# Patient Record
Sex: FEMALE | Race: BLACK OR AFRICAN AMERICAN | NOT HISPANIC OR LATINO | Employment: FULL TIME | URBAN - METROPOLITAN AREA
[De-identification: names, ages, dates, MRNs, and addresses within clinical notes are randomized per-mention and may not be internally consistent; named-entity substitution may affect disease eponyms.]

---

## 2023-03-22 ENCOUNTER — OFFICE VISIT (OUTPATIENT)
Dept: FAMILY MEDICINE CLINIC | Facility: CLINIC | Age: 68
End: 2023-03-22

## 2023-03-22 VITALS
RESPIRATION RATE: 16 BRPM | OXYGEN SATURATION: 98 % | DIASTOLIC BLOOD PRESSURE: 80 MMHG | HEART RATE: 65 BPM | HEIGHT: 63 IN | BODY MASS INDEX: 28.7 KG/M2 | WEIGHT: 162 LBS | SYSTOLIC BLOOD PRESSURE: 122 MMHG | TEMPERATURE: 97.5 F

## 2023-03-22 DIAGNOSIS — R07.89 OTHER CHEST PAIN: ICD-10-CM

## 2023-03-22 DIAGNOSIS — E11.9 TYPE 2 DIABETES MELLITUS WITHOUT COMPLICATION, UNSPECIFIED WHETHER LONG TERM INSULIN USE (HCC): Primary | ICD-10-CM

## 2023-03-22 PROBLEM — E55.9 VITAMIN D DEFICIENCY: Status: ACTIVE | Noted: 2021-07-29

## 2023-03-22 PROBLEM — R91.1 PULMONARY NODULE: Status: ACTIVE | Noted: 2023-03-17

## 2023-03-22 PROBLEM — E78.2 MIXED HYPERLIPIDEMIA: Status: ACTIVE | Noted: 2021-07-29

## 2023-03-22 RX ORDER — INSULIN GLARGINE 100 [IU]/ML
16 INJECTION, SOLUTION SUBCUTANEOUS DAILY
COMMUNITY
Start: 2023-03-17

## 2023-03-22 RX ORDER — INSULIN GLARGINE-YFGN 100 [IU]/ML
10 INJECTION, SOLUTION SUBCUTANEOUS EVERY MORNING
COMMUNITY
Start: 2023-01-05

## 2023-03-22 RX ORDER — ROSUVASTATIN CALCIUM 40 MG/1
40 TABLET, COATED ORAL DAILY
COMMUNITY
Start: 2022-07-28 | End: 2023-07-28

## 2023-03-22 NOTE — ASSESSMENT & PLAN NOTE
Lab Results   Component Value Date    HGBA1C 11 3 (H) 03/09/2023     Poorly controlled  Is following with [facility] RN for diabetes education  Was told to hold insulin due to hypoglycemia risk since her blood sugars have been much better controlled with diet  Post prandial 1h about 130-150 without insulin per patient  Recommend starting Metformin 500 mg qAM with breakfast since she previously had nausea with higher dose and discontinued  Discussed no risk of hypoglycemia with Metformin  She is agreeable to a trial  Will ask [facility] nurse to forward notes  Follow up in 3 months

## 2023-03-22 NOTE — PROGRESS NOTES
Name: Katherine Bang      : 1955      MRN: [id number]  Encounter Provider: Niko Rivera DO  Encounter Date: 3/22/2023   Encounter department: [address] [place] [address]      1  Type 2 diabetes mellitus without complication, unspecified whether long term insulin use (HCC)  Assessment & Plan:    Lab Results   Component Value Date    HGBA1C 11 3 (H) 2023     Poorly controlled  Is following with [facility] RN for diabetes education  Was told to hold insulin due to hypoglycemia risk since her blood sugars have been much better controlled with diet  Post prandial 1h about 130-150 without insulin per patient  Recommend starting Metformin 500 mg qAM with breakfast since she previously had nausea with higher dose and discontinued  Discussed no risk of hypoglycemia with Metformin  She is agreeable to a trial  Will ask [facility] nurse to forward notes  Follow up in 3 months  Orders:  -     metFORMIN (GLUCOPHAGE) 500 mg tablet; Take 1 tablet (500 mg total) by mouth daily with breakfast    2  Other chest pain  Assessment & Plan:  Has had several ED visits with negative cardiac workup, diagnosed with chest wall pain  Recommend NSAIDs and stretching, can use topical analgesics as well  If no improvement will send to PT  Subjective      HPI     Chest pain on the left side, was seen at [facility] and heart was normal, diagnosed with chest wall pain  Has history of DM, poorly controlled  Chest wall pain is most concerning to her today  She has been taking baby aspirin prn but without relief  She will take Advil and use topical analgesics for pain relief  Saw dietician for DM yesterday, was told to stay off insulin, eat healthy for two weeks and continue monitoring  She saw David Aguirre RN, [facility] at [facility] physicians practice  One hour after eating her glucose ranges from 130-150  She felt faint after taking insulin  Last A1c 3/9/23 was 11 3   She is agreeable to starting Metformin 500 mg daily in the morning with food  Review of Systems   Constitutional: Negative  HENT: Negative  Respiratory: Negative  Cardiovascular: Positive for chest pain (reproducible with palpation)  Gastrointestinal: Negative  No current outpatient medications on file prior to visit  Objective     /80 (BP Location: Left arm, Patient Position: Sitting, Cuff Size: Large)   Pulse 65   Temp 97 5 °F (36 4 °C) (Temporal)   Resp 16   Ht 5' 3" (1 6 m)   Wt 73 5 kg (162 lb)   SpO2 98%   BMI 28 70 kg/m²     Physical Exam  Vitals reviewed  Constitutional:       Appearance: Normal appearance  HENT:      Head: Normocephalic and atraumatic  Right Ear: External ear normal       Left Ear: External ear normal       Nose: Nose normal    Eyes:      Extraocular Movements: Extraocular movements intact  Conjunctiva/sclera: Conjunctivae normal    Cardiovascular:      Rate and Rhythm: Normal rate and regular rhythm  Heart sounds: Normal heart sounds  Pulmonary:      Effort: Pulmonary effort is normal       Breath sounds: Normal breath sounds  Abdominal:      General: Abdomen is flat  Skin:     General: Skin is warm and dry  Capillary Refill: Capillary refill takes less than 2 seconds  Neurological:      General: No focal deficit present  Mental Status: She is alert and oriented to person, place, and time     Psychiatric:         Mood and Affect: Mood normal          Behavior: Behavior normal        Keyonbetsy Pantoja, DO

## 2023-03-22 NOTE — ASSESSMENT & PLAN NOTE
Has had several ED visits with negative cardiac workup, diagnosed with chest wall pain  Recommend NSAIDs and stretching, can use topical analgesics as well   If no improvement will send to PT

## 2023-03-24 ENCOUNTER — TELEPHONE (OUTPATIENT)
Dept: ADMINISTRATIVE | Facility: OTHER | Age: 68
End: 2023-03-24

## 2023-03-24 NOTE — TELEPHONE ENCOUNTER
Upon review of the In Basket request we were able to locate, review, and update the patient chart as requested for Mammogram     Any additional questions or concerns should be emailed to the Practice Liaisons via the appropriate education email address, please do not reply via In Basket      Thank you  Wayne Goldberg

## 2023-03-24 NOTE — TELEPHONE ENCOUNTER
----- Message from Stevo Toribio LPN sent at 5/45/0518  3:00 PM EDT -----  Regarding: care gap request  03/22/23 3:00 PM    Hello, our patient attached above has had Mammogram completed/performed  Please assist in updating the patient chart by pulling a previous Electronic Medical Record (EMR) document   The previous EMR is in [place] under the "Other Results" heading from [facility] date of service is 10/14/22    Thank you,  Stevo Mcmahon 1943

## 2024-06-10 ENCOUNTER — TELEPHONE (OUTPATIENT)
Age: 69
End: 2024-06-10

## 2024-06-10 NOTE — TELEPHONE ENCOUNTER
ASC Screening    ASC Screening  BMI > than 45: No  Are you currently pregnant?: No  Do you rely on a wheelchair for mobility?: No  Do you need oxygen during the day?: No  Have you ever been informed by anesthesia that you have a difficult airway?: No  Have you been diagnosed with End Stage Renal Disease (ESRD)?: No  Are you actively on dialysis?: No  Have you been diagnosed with Pulmonary Hypertension?: No  Do you have a pacemaker or an Automatic Implantable Cardioverter Defibrillator (AICD)?: No  Have you ever had an organ transplant?: No  Have you had a stroke, heart attack, myocardial infarction (MI) within the last 6 months?: No  Have you ever been diagnosed with Aortic Stenosis?: No  Have you ever been diagnosed  with Congestive Heart Failure?: No  Have you ever been diagnosed with a heart valve disease?: No  Are you Diabetic?: Yes  If you are Diabetic, has your A1C been greater than 12 within the last six months?: No

## 2024-06-10 NOTE — TELEPHONE ENCOUNTER
06/10/24  Screened by: Franci Goodson     Referring Provider      Pre- Screening:      There is no height or weight on file to calculate BMI.  Has patient been referred for a routine screening Colonoscopy? yes  Is the patient between 45-75 years old? yes        Previous Colonoscopy yes   If yes:    Date:more than 20 years ago      Facility:     Reason:         Does the patient want to see a Gastroenterologist prior to their procedure OR are they having any GI symptoms? no     Has the patient been hospitalized or had abdominal surgery in the past 6 months? no     Does the patient use supplemental oxygen? no     Does the patient take Coumadin, Lovenox, Plavix, Elliquis, Xarelto, or other blood thinning medication? no     Has the patient had a stroke, cardiac event, or stent placed in the past year? no        If patient is between 45yrs - 49yrs, please advise patient that we will have to confirm benefits & coverage with their insurance company for a routine screening colonoscopy.

## 2024-06-10 NOTE — TELEPHONE ENCOUNTER
OA Colonoscopy  Scheduled date of colonoscopy (as of today):06/17/2024  Physician performing colonoscopy:DR Jaiyeola  Location of colonoscopy:[facility]   Bowel prep reviewed with patient:miralax/dul  Instructions reviewed with patient by:sent via email  Clearances: n/a

## 2024-06-12 ENCOUNTER — ANESTHESIA EVENT (OUTPATIENT)
Dept: ANESTHESIOLOGY | Facility: HOSPITAL | Age: 69
End: 2024-06-12

## 2024-06-12 ENCOUNTER — ANESTHESIA (OUTPATIENT)
Dept: ANESTHESIOLOGY | Facility: HOSPITAL | Age: 69
End: 2024-06-12

## 2024-06-14 ENCOUNTER — TELEPHONE (OUTPATIENT)
Dept: GASTROENTEROLOGY | Facility: MEDICAL CENTER | Age: 69
End: 2024-06-14

## 2024-06-14 RX ORDER — SODIUM CHLORIDE 9 MG/ML
125 INJECTION, SOLUTION INTRAVENOUS CONTINUOUS
OUTPATIENT
Start: 2024-06-14

## 2024-06-14 NOTE — TELEPHONE ENCOUNTER
Patient's assistant/Friend who is not listed on the medical consent form called in asking questions regarding prep, procedure and arrival. Amalia was inform to have patient call in to verify details.

## 2024-06-14 NOTE — TELEPHONE ENCOUNTER
Pt calling for prep instructions, emailed over. Pt having a difficult time understanding prep instructions, was in the process of  transferring to [facility] clerical for further assistance when pt ended call.

## 2024-06-14 NOTE — TELEPHONE ENCOUNTER
Pt calling stating she needs shabnam/dul prep explained to her. I informed pt shabnam 238 grams powder and 5 mg tablets for dul. I also informed pt to  blue, yellow or frost white 64 oz gatorade. Pt said she is diabetic, I suggested the zero sugar one. Pt understood all.

## 2024-06-16 ENCOUNTER — ANESTHESIA (OUTPATIENT)
Dept: ANESTHESIOLOGY | Facility: HOSPITAL | Age: 69
End: 2024-06-16

## 2024-06-16 ENCOUNTER — ANESTHESIA EVENT (OUTPATIENT)
Dept: ANESTHESIOLOGY | Facility: HOSPITAL | Age: 69
End: 2024-06-16

## 2024-06-16 NOTE — ANESTHESIA PREPROCEDURE EVALUATION
Procedure:  PRE-OP ONLY    Relevant Problems   CARDIO   (+) Mixed hyperlipidemia   (+) Other chest pain      ENDO   (+) Type 2 diabetes mellitus without complication (HCC)      MUSCULOSKELETAL   (+) Osteoarthrosis             Anesthesia Plan  ASA Score- 3     Anesthesia Type- IV sedation with anesthesia with ASA Monitors.         Additional Monitors:     Airway Plan:            Plan Factors-    Chart reviewed.   Existing labs reviewed. Patient summary reviewed.                  Induction-     Postoperative Plan-         Informed Consent-

## 2024-06-20 ENCOUNTER — TELEPHONE (OUTPATIENT)
Dept: GASTROENTEROLOGY | Facility: MEDICAL CENTER | Age: 69
End: 2024-06-20

## 2024-06-20 NOTE — TELEPHONE ENCOUNTER
Called patient to see if she wanted to reschedule procedure patient refused, saying she is sick and procedure is too expensive. She will call within 6 months to see if she can pay for it.

## 2024-06-21 ENCOUNTER — TELEPHONE (OUTPATIENT)
Dept: GASTROENTEROLOGY | Facility: MEDICAL CENTER | Age: 69
End: 2024-06-21

## 2024-06-21 NOTE — TELEPHONE ENCOUNTER
Left voicemail and requested call back     Called patient to inform that provider would like to schedule office visit with any of our providers to other prep options.   RE: Prep  Received: Today  MD Munira Ruffin  That is the easiest preparation.  Patient may need an office visit to discuss why they cannot tolerate and also may be discussed alternative ways for screening.  This can be scheduled with anyone.          Previous Messages       ----- Message -----  From: Munira Porter  Sent: 6/21/2024   1:48 PM EDT  To: Jair Guthrie MD  Subject: Prep                                            Patient couldn't tolerate prep wishes to see if there is another alternative instead of Miralax? Please Advise

## 2024-06-21 NOTE — TELEPHONE ENCOUNTER
Patient called can not tolerate prep wishes alternative did message provider for advise.        Prep  Received: Today  Munira Guthrie MD  Patient couldn't tolerate prep wishes to see if there is another alternative instead of Miralax? Please Advise

## 2024-06-21 NOTE — TELEPHONE ENCOUNTER
Pt returned call to schedule colonoscopy. Pt requesting a different prep. Call was transferred to JENNY to assist pt.

## 2024-06-21 NOTE — TELEPHONE ENCOUNTER
Pt was contacted by the Munira Porter on 06/21/2024 3:32. Munira is awaiting her return call.  See notes from 06/21/2024. .

## 2024-06-21 NOTE — TELEPHONE ENCOUNTER
Patients GI provider:  Dr. Guthrie    Number to return call: [phone]    Reason for call: Pt and patient's assist Amalia called in to reschedule procedure. Rosaline ask if there can be an alternative medication due to patient not being able to tolerate the miralax/dul. Patient also ask if the provider was okay with patient fasting for 3 days and only having broth and no prep medication. Rosaline asked if we can speak with Amalia for future communication, advised Rosaline to update communication consent form on [employer] [facility].    Scheduled procedure/appointment date if applicable: Apt/procedure 6/28/2024

## 2024-06-24 NOTE — TELEPHONE ENCOUNTER
Pt scheduled for 6/27/24 with Dr. Waterman. Self pay estimate provided. Pt does not have MyChart. Please sent pt self pay documentation.

## 2024-06-27 ENCOUNTER — OFFICE VISIT (OUTPATIENT)
Dept: GASTROENTEROLOGY | Facility: CLINIC | Age: 69
End: 2024-06-27

## 2024-06-27 VITALS
BODY MASS INDEX: 28.7 KG/M2 | WEIGHT: 162 LBS | SYSTOLIC BLOOD PRESSURE: 112 MMHG | TEMPERATURE: 97.4 F | HEIGHT: 63 IN | DIASTOLIC BLOOD PRESSURE: 64 MMHG

## 2024-06-27 DIAGNOSIS — R10.84 GENERALIZED ABDOMINAL PAIN: ICD-10-CM

## 2024-06-27 DIAGNOSIS — K57.32 DIVERTICULITIS OF LARGE INTESTINE WITHOUT PERFORATION OR ABSCESS WITHOUT BLEEDING: ICD-10-CM

## 2024-06-27 DIAGNOSIS — Z12.11 SCREENING FOR COLON CANCER: Primary | ICD-10-CM

## 2024-06-27 DIAGNOSIS — R11.2 NAUSEA AND VOMITING, UNSPECIFIED VOMITING TYPE: ICD-10-CM

## 2024-06-27 DIAGNOSIS — R11.10 REGURGITATION OF FOOD: ICD-10-CM

## 2024-06-27 PROCEDURE — 99204 OFFICE O/P NEW MOD 45 MIN: CPT | Performed by: INTERNAL MEDICINE

## 2024-06-27 RX ORDER — BISACODYL 5 MG/1
20 TABLET, DELAYED RELEASE ORAL ONCE
Qty: 4 TABLET | Refills: 0 | Status: SHIPPED | OUTPATIENT
Start: 2024-06-27 | End: 2024-06-27

## 2024-06-27 RX ORDER — ONDANSETRON 4 MG/1
4 TABLET, ORALLY DISINTEGRATING ORAL EVERY 8 HOURS PRN
Qty: 3 TABLET | Refills: 0 | Status: SHIPPED | OUTPATIENT
Start: 2024-06-27 | End: 2024-06-28

## 2024-06-27 RX ORDER — POLYETHYLENE GLYCOL 3350 17 G/17G
238 POWDER, FOR SOLUTION ORAL ONCE
Qty: 238 G | Refills: 0 | Status: SHIPPED | OUTPATIENT
Start: 2024-06-27 | End: 2024-06-27

## 2024-06-27 NOTE — PROGRESS NOTES
[facility] - Outpatient Consultation  Rosaline Squires 69 y.o. female MRN: [id number]  Encounter: [id number]          ASSESSMENT AND PLAN:      1. Screening for colon cancer  2.  History of colon diverticulitis  3.  Left-sided abdominal pain  4.  Regurgitation of food    Patient is due for colonoscopy.  She is agreeable to get procedure done.  She would like lower volume prep.  I have prescribed Dulcolax and MiraLAX prep.  I will also prescribe her Zofran to help decrease risk for nausea and vomiting with prep intake.  She should take the medication at least the first dose prior to starting the bowel prep.  I discussed with her about getting EGD to investigate for ulcer disease given nausea and vomiting and abdominal pain symptoms.  Patient currently declined.  I discussed with her about getting CT scan to investigate for any stones in the kidneys or problems with other organs that might cause symptoms.  Patient agreeable.  Ordered the CT scan today.  Further management plan based on investigation results.    RTC 4 to 6 months.      Felipe Waterman MD  Gastroenterology  [facility]  Date: June 27, 2024    - Colonoscopy; Future  - bisacodyl (DULCOLAX) 5 mg EC tablet; Take 4 tablets (20 mg total) by mouth once for 1 dose Colonoscopy prep  Dispense: 4 tablet; Refill: 0  - polyethylene glycol (MIRALAX) 17 g packet; Take 238 g by mouth once for 1 dose For bowel prep. Mix in 64 oz of gatorade. Drink 32 oz at the rate of 8 oz/1 glass every 15 mins starting at 5 pm on the evening prior to day of procedure. Drink the remaining 32 oz 5 hours prior to procedure time at at the rate of 8 oz/1 glass every 15 mins until finished.  Dispense: 238 g; Refill: 0    ______________________________________________________________________    HPI: 69-year-old with history of diabetes presents for evaluation.    Patient denies any nausea vomiting.  She does report left-sided abdominal pain  which has been constant for a while.  Varies in intensity.  She had more abdominal pain all over the belly in the past which decreased when she reduced taking spicy foods.  She was scheduled for colonoscopy and vomited her prep up.  Could not tolerate prep.  It was large volume.  Her last colonoscopy was more than 20 years ago.  There is no family history of colon cancer.    Patient had labs done in March 2023 which showed normal blood counts, renal function liver enzymes.  In 2018 patient had CT abdomen which showed diverticulitis of the sigmoid colon.      REVIEW OF SYSTEMS:    CONSTITUTIONAL: Denies any fever, chills, rigors, and weight loss.  HEENT: No earache or tinnitus. Denies hearing loss or visual disturbances.  CARDIOVASCULAR: No chest pain or palpitations.   RESPIRATORY: Denies any cough, hemoptysis, shortness of breath or dyspnea on exertion.  GASTROINTESTINAL: As noted in the History of Present Illness.   GENITOURINARY: No problems with urination. Denies any hematuria or dysuria.  NEUROLOGIC: No dizziness or vertigo, denies headaches.   MUSCULOSKELETAL: Denies any muscle or joint pain.   SKIN: Denies skin rashes or itching.   ENDOCRINE: Denies excessive thirst. Denies intolerance to heat or cold.  PSYCHOSOCIAL: Denies depression or anxiety. Denies any recent memory loss.       Historical Information   History reviewed. No pertinent past medical history.  History reviewed. No pertinent surgical history.  Social History   Social History     Substance and Sexual Activity   Alcohol Use Never     Social History     Substance and Sexual Activity   Drug Use Never     Social History     Tobacco Use   Smoking Status Never    Passive exposure: Past   Smokeless Tobacco Never     History reviewed. No pertinent family history.    Meds/Allergies       Current Outpatient Medications:     bisacodyl (DULCOLAX) 5 mg EC tablet    metFORMIN (GLUCOPHAGE) 500 mg tablet    polyethylene glycol (MIRALAX) 17 g packet     "Empagliflozin (JARDIANCE) 10 MG TABS tablet    Insulin Glargine Solostar 100 UNIT/ML SOPN    rosuvastatin (CRESTOR) 40 MG tablet    Semglee, yfgn, 100 UNIT/ML SOPN    Allergies   Allergen Reactions    Hydrocodone-Acetaminophen GI Intolerance    Latex Other (See Comments)     Other reaction(s): itching  rash    Oxycodone Hcl GI Intolerance    Oxycodone GI Intolerance           Objective     Blood pressure 112/64, temperature (!) 97.4 °F (36.3 °C), height 5' 3\" (1.6 m), weight 73.5 kg (162 lb). Body mass index is 28.7 kg/m².        PHYSICAL EXAM:      General Appearance:   Alert, cooperative, no distress   HEENT:   Normocephalic, atraumatic, anicteric.     Neck:  Supple, symmetrical, trachea midline   Lungs:   Clear to auscultation bilaterally; no rales, rhonchi or wheezing; respirations unlabored    Heart::   Regular rate and rhythm; no murmur, rub, or gallop.   Abdomen:   Soft, non-tender, non-distended; normal bowel sounds; no masses, no organomegaly    Genitalia:   Deferred    Rectal:   Deferred    Extremities:  No cyanosis, clubbing or edema    Pulses:  2+ and symmetric    Skin:  No jaundice, rashes, or lesions    Lymph nodes:  No palpable cervical lymphadenopathy        Lab Results:   No visits with results within 1 Day(s) from this visit.   Latest known visit with results is:   Orders Only on 06/08/2022   Component Date Value    EXT Creatinine Urine 06/08/2022 116.0     Albumin,U,Random 06/08/2022 0.9     Alb/Creat Ratio 06/08/2022 7.8          Radiology Results:   No results found.   "

## 2024-06-29 NOTE — PATIENT INSTRUCTIONS
Scheduled date of colonoscopy (as of today): 07/11/2024  Physician performing colonoscopy: Dr. Waterman   Location of colonoscopy: [facility]  Bowel prep reviewed with patient: Miralax   Instructions reviewed with patient by: Francia DOWELL   Clearances:  N/A     Pt provided Diabetes Medication Instruction Sheet   CT Scan Scheduled for 07/13/2024

## 2024-07-11 ENCOUNTER — HOSPITAL ENCOUNTER (OUTPATIENT)
Dept: GASTROENTEROLOGY | Facility: MEDICAL CENTER | Age: 69
Setting detail: OUTPATIENT SURGERY
Discharge: HOME | End: 2024-07-11

## 2024-07-11 ENCOUNTER — ANESTHESIA (OUTPATIENT)
Dept: GASTROENTEROLOGY | Facility: MEDICAL CENTER | Age: 69
End: 2024-07-11

## 2024-07-11 ENCOUNTER — ANESTHESIA EVENT (OUTPATIENT)
Dept: GASTROENTEROLOGY | Facility: MEDICAL CENTER | Age: 69
End: 2024-07-11

## 2024-07-11 ENCOUNTER — TELEPHONE (OUTPATIENT)
Dept: GASTROENTEROLOGY | Facility: CLINIC | Age: 69
End: 2024-07-11

## 2024-07-11 VITALS
SYSTOLIC BLOOD PRESSURE: 130 MMHG | HEART RATE: 60 BPM | DIASTOLIC BLOOD PRESSURE: 59 MMHG | OXYGEN SATURATION: 98 % | TEMPERATURE: 96.5 F | RESPIRATION RATE: 20 BRPM

## 2024-07-11 DIAGNOSIS — G89.29 CHRONIC ABDOMINAL PAIN: Primary | ICD-10-CM

## 2024-07-11 DIAGNOSIS — R10.9 CHRONIC ABDOMINAL PAIN: Primary | ICD-10-CM

## 2024-07-11 DIAGNOSIS — Z12.11 SCREENING FOR COLON CANCER: ICD-10-CM

## 2024-07-11 LAB — GLUCOSE SERPL-MCNC: 110 MG/DL (ref 65–140)

## 2024-07-11 PROCEDURE — G0121 COLON CA SCRN NOT HI RSK IND: HCPCS | Performed by: INTERNAL MEDICINE

## 2024-07-11 PROCEDURE — 82948 REAGENT STRIP/BLOOD GLUCOSE: CPT

## 2024-07-11 RX ORDER — SODIUM CHLORIDE 9 MG/ML
125 INJECTION, SOLUTION INTRAVENOUS CONTINUOUS
Status: DISCONTINUED | OUTPATIENT
Start: 2024-07-11 | End: 2024-07-15 | Stop reason: HOSPADM

## 2024-07-11 RX ORDER — LIDOCAINE HYDROCHLORIDE 10 MG/ML
INJECTION, SOLUTION EPIDURAL; INFILTRATION; INTRACAUDAL; PERINEURAL AS NEEDED
Status: DISCONTINUED | OUTPATIENT
Start: 2024-07-11 | End: 2024-07-11

## 2024-07-11 RX ORDER — SODIUM CHLORIDE 9 MG/ML
INJECTION, SOLUTION INTRAVENOUS CONTINUOUS PRN
Status: DISCONTINUED | OUTPATIENT
Start: 2024-07-11 | End: 2024-07-11

## 2024-07-11 RX ORDER — PROPOFOL 10 MG/ML
INJECTION, EMULSION INTRAVENOUS CONTINUOUS PRN
Status: DISCONTINUED | OUTPATIENT
Start: 2024-07-11 | End: 2024-07-11

## 2024-07-11 RX ADMIN — LIDOCAINE HYDROCHLORIDE 100 MG: 10 INJECTION, SOLUTION EPIDURAL; INFILTRATION; INTRACAUDAL at 07:38

## 2024-07-11 RX ADMIN — SODIUM CHLORIDE: 0.9 INJECTION, SOLUTION INTRAVENOUS at 07:38

## 2024-07-11 RX ADMIN — PROPOFOL 100 MCG/KG/MIN: 10 INJECTION, EMULSION INTRAVENOUS at 07:39

## 2024-07-11 RX ADMIN — SODIUM CHLORIDE 125 ML/HR: 0.9 INJECTION, SOLUTION INTRAVENOUS at 07:17

## 2024-07-11 RX ADMIN — PROPOFOL 100 MG: 10 INJECTION, EMULSION INTRAVENOUS at 07:38

## 2024-07-11 NOTE — PROGRESS NOTES
Patient having increased abdominal pain in the left upper quadrant/flank area after the procedure.  She has chronic abdominal pain but this is increased in severity.  I palpated patient's belly which was soft on exam and tenderness did not increase on palpation.  I discussed with patient that this is probably gas related pain and that she should walk/pass gas and it should probably improve.  If the pain does not improve or get worse over the next few hours, patient should go to ER for further medical attention.  At the same time I will order CT scan for the patient to evaluate chronic abdominal pain as well as message staff to arrange follow-up in the office.  Patient agreeable with plan.     Felipe Waterman MD  Gastroenterology  [facility]  Date: July 11, 2024

## 2024-07-11 NOTE — ANESTHESIA PREPROCEDURE EVALUATION
Procedure:  COLONOSCOPY    Relevant Problems   CARDIO   (+) Mixed hyperlipidemia   (+) Other chest pain      ENDO   (+) Type 2 diabetes mellitus without complication (HCC)      MUSCULOSKELETAL   (+) Osteoarthrosis        Physical Exam    Airway    Mallampati score: II  TM Distance: >3 FB  Neck ROM: full     Dental        Cardiovascular      Pulmonary      Other Findings  post-pubertal.      Anesthesia Plan  ASA Score- 2     Anesthesia Type- IV sedation with anesthesia with ASA Monitors.         Additional Monitors:     Airway Plan:            Plan Factors-Exercise tolerance (METS): >4 METS.    Chart reviewed.    Patient summary reviewed.                  Induction- intravenous.    Postoperative Plan-     Perioperative Resuscitation Plan - Level 1 - Full Code.       Informed Consent- Anesthetic plan and risks discussed with patient.  I personally reviewed this patient with the CRNA. Discussed and agreed on the Anesthesia Plan with the CRNA..

## 2024-07-11 NOTE — H&P
History and Physical - [facility]  Rosaline Squires 69 y.o. female MRN: [id number]                  HPI: Rosaline Squires is a 69 y.o. year old female who presents for colonoscopy for colon cancer screening.       REVIEW OF SYSTEMS: Per the HPI, and otherwise unremarkable.    Historical Information   Past Medical History:   Diagnosis Date    Diabetes mellitus (HCC)      Past Surgical History:   Procedure Laterality Date     SECTION       Social History   Social History     Substance and Sexual Activity   Alcohol Use Never     Social History     Substance and Sexual Activity   Drug Use Never     Social History     Tobacco Use   Smoking Status Never    Passive exposure: Past   Smokeless Tobacco Never     History reviewed. No pertinent family history.    Meds/Allergies     Not in a hospital admission.    Allergies   Allergen Reactions    Hydrocodone-Acetaminophen GI Intolerance    Latex Other (See Comments)     Other reaction(s): itching  rash    Oxycodone Hcl GI Intolerance    Oxycodone GI Intolerance       Objective     Blood pressure 114/61, pulse 67, temperature (!) 96.5 °F (35.8 °C), temperature source Temporal, resp. rate 18, SpO2 98%.      PHYSICAL EXAM    Gen: NAD  CV: RRR  CHEST: Clear  ABD: soft, NT/ND  EXT: no edema      ASSESSMENT/PLAN:  Rosaline Squires is a 69 y.o. year old female who presents for colonoscopy for colon cancer screening.  The patient is stable and optimized for the procedure, we reviewed risk and benefits. Risk include but not limited to infection, bleeding, perforation and missing a lesion.

## 2024-07-11 NOTE — TELEPHONE ENCOUNTER
----- Message from Charles KNOTT sent at 7/11/2024 10:35 AM EDT -----    ----- Message -----  From: Felipe Waterman MD  Sent: 7/11/2024   9:42 AM EDT  To: #    Please arrange follow-up in the GI office in 2 to 4 weeks.  Please tell patient to get CT scan done at least 1 week prior to follow-up with her.  If no slot available with me, please schedule with one of the physician assistants or another provider.

## 2024-07-11 NOTE — PROGRESS NOTES
Dr. Waterman assessed patient and informed patient that she may have gas and should walk to try to express it.  He also suggested that she has a CT scan as well.

## 2024-12-13 NOTE — PROGRESS NOTES
Name: Rosaline Squires      : 1955      MRN: [id number]  Encounter Provider: Alireza Jackson MD  Encounter Date: 2024   Encounter department: [facility]  :  Assessment & Plan  LLQ pain  Chronic intermittent LLQ pain. No imaging done yet. Encouraged the patient to get the CT scan as ordered last visit. Patient will schedule CT scan. High on the differential is gas pain/constipation given LLQ is better with defecation. Of note CT A/P in May 2024 (under CareEverywhere) showed uncomplicated sigmoid diverticulitis.          Colon cancer screening  She underwent colonoscopy in 2024 which showed poor prep. Patient reports eating at 9 PM prior to the colonoscopy because she was hungry. Discussed the importance of preparation and the need for extended preparation next time. Advised the patient to read the instructions for colonoscopy in advance for the next colonoscopy. She would like to wait to schedule it so she can get it done 1 year from her prior colonoscopy as instructed.   Orders:    polyethylene glycol (GOLYTELY) 4000 mL solution; Take 8,000 mL by mouth once for 1 dose    Colonoscopy; Future        History of Present Illness   HPI  Rosaline Squires is a 69 y.o. female PMH DM who presents for crc screening  History obtained from: patient    Last seen by Dr. Waterman in 2024    Colonoscopy 2024 for screening: poor prep, diverticula; repeat in 1 year  Had colonoscopy at the age of 50 - no polyps.    Chronic constipation which is better after changing her diet. Now eating more vegetables and beans without constipation now. Having BM daily.   Intermittent LLQ pain which is better with defecation.  Denies dysphagia, odynophagia, nausea, vomiting, unintentional weight loss, melena, BRBPR.   Reports intermittent heartburn with certain food.    Fhx with no known colon cancer but family did not go to the hospital.   Shx: negative toxic habits x3    Review of  Systems  Current Outpatient Medications on File Prior to Visit   Medication Sig Dispense Refill    metFORMIN (GLUCOPHAGE) 500 mg tablet Take 1 tablet (500 mg total) by mouth daily with breakfast 90 tablet 0    rosuvastatin (CRESTOR) 40 MG tablet Take 40 mg by mouth daily      [DISCONTINUED] bisacodyl (DULCOLAX) 5 mg EC tablet Take 4 tablets (20 mg total) by mouth once for 1 dose Colonoscopy prep 4 tablet 0    [DISCONTINUED] Empagliflozin (JARDIANCE) 10 MG TABS tablet Take 10 mg by mouth daily (Patient not taking: Reported on 6/27/2024)      [DISCONTINUED] Insulin Glargine Solostar 100 UNIT/ML SOPN Inject 16 Units under the skin daily (Patient not taking: Reported on 3/22/2023)      [DISCONTINUED] ondansetron (Zofran ODT) 4 mg disintegrating tablet Take 1 tablet (4 mg total) by mouth every 8 (eight) hours as needed for nausea or vomiting for up to 1 day 3 tablet 0    [DISCONTINUED] polyethylene glycol (MIRALAX) 17 g packet Take 238 g by mouth once for 1 dose For bowel prep. Mix in 64 oz of gatorade. Drink 32 oz at the rate of 8 oz/1 glass every 15 mins starting at 5 pm on the evening prior to day of procedure. Drink the remaining 32 oz 5 hours prior to procedure time at at the rate of 8 oz/1 glass every 15 mins until finished. 238 g 0    [DISCONTINUED] Semglee, yfgn, 100 UNIT/ML SOPN Inject 10 Units under the skin every morning (Patient not taking: Reported on 3/22/2023)       No current facility-administered medications on file prior to visit.         Objective   /84   Pulse 65   Temp 98.3 °F (36.8 °C)   Wt 74.6 kg (164 lb 6.4 oz)   BMI 29.12 kg/m²      Physical Exam  Vitals reviewed.   Constitutional:       Appearance: She is well-developed.   Eyes:      Extraocular Movements: Extraocular movements intact.   Pulmonary:      Effort: No respiratory distress.   Neurological:      General: No focal deficit present.      Mental Status: She is alert.

## 2024-12-16 ENCOUNTER — OFFICE VISIT (OUTPATIENT)
Dept: GASTROENTEROLOGY | Facility: MEDICAL CENTER | Age: 69
End: 2024-12-16

## 2024-12-16 VITALS
TEMPERATURE: 98.3 F | SYSTOLIC BLOOD PRESSURE: 132 MMHG | HEART RATE: 65 BPM | BODY MASS INDEX: 29.12 KG/M2 | WEIGHT: 164.4 LBS | DIASTOLIC BLOOD PRESSURE: 84 MMHG

## 2024-12-16 DIAGNOSIS — Z12.11 COLON CANCER SCREENING: ICD-10-CM

## 2024-12-16 DIAGNOSIS — R10.32 LLQ PAIN: Primary | ICD-10-CM

## 2024-12-16 PROCEDURE — 99214 OFFICE O/P EST MOD 30 MIN: CPT | Performed by: INTERNAL MEDICINE

## 2024-12-16 RX ORDER — SODIUM CHLORIDE, SODIUM LACTATE, POTASSIUM CHLORIDE, CALCIUM CHLORIDE 600; 310; 30; 20 MG/100ML; MG/100ML; MG/100ML; MG/100ML
125 INJECTION, SOLUTION INTRAVENOUS CONTINUOUS
OUTPATIENT
Start: 2024-12-16

## 2025-06-04 ENCOUNTER — TELEPHONE (OUTPATIENT)
Dept: GASTROENTEROLOGY | Facility: MEDICAL CENTER | Age: 70
End: 2025-06-04

## 2025-06-04 DIAGNOSIS — Z12.11 COLON CANCER SCREENING: Primary | ICD-10-CM

## 2025-06-04 RX ORDER — POLYETHYLENE GLYCOL 3350 17 G/17G
238 POWDER, FOR SOLUTION ORAL ONCE
Qty: 238 G | Refills: 0 | Status: SHIPPED | OUTPATIENT
Start: 2025-06-04 | End: 2025-06-04

## 2025-06-04 RX ORDER — BISACODYL 5 MG/1
20 TABLET, DELAYED RELEASE ORAL ONCE
Qty: 4 TABLET | Refills: 0 | Status: SHIPPED | OUTPATIENT
Start: 2025-06-04 | End: 2025-06-04

## 2025-06-04 NOTE — TELEPHONE ENCOUNTER
Patient came into office to schedule her repeat colonoscopy    Procedure: Colonoscopy  Date: July 2  Physician performing: Dr. Waterman  Location of procedure:  [facility]  Instructions given to patient: 2 Day Golytely and diabetic medication instructions  Clearances: None  Diabetic: Metformin

## 2025-06-09 ENCOUNTER — PATIENT MESSAGE (OUTPATIENT)
Dept: GASTROENTEROLOGY | Facility: CLINIC | Age: 70
End: 2025-06-09

## 2025-06-18 ENCOUNTER — ANESTHESIA (OUTPATIENT)
Dept: ANESTHESIOLOGY | Facility: HOSPITAL | Age: 70
End: 2025-06-18

## 2025-06-18 ENCOUNTER — ANESTHESIA EVENT (OUTPATIENT)
Dept: ANESTHESIOLOGY | Facility: HOSPITAL | Age: 70
End: 2025-06-18

## 2025-06-30 ENCOUNTER — TELEPHONE (OUTPATIENT)
Dept: GASTROENTEROLOGY | Facility: MEDICAL CENTER | Age: 70
End: 2025-06-30

## 2025-06-30 NOTE — TELEPHONE ENCOUNTER
Patient came into office today to cancel her procedure scheduled for 7/2/25 with . Patient stated she is going to be out of the country and will be gone for 3 months wanted me to cancel the procedure and will give us a call back in 3 months to reschedule once she is back